# Patient Record
Sex: FEMALE | Race: WHITE | NOT HISPANIC OR LATINO | Employment: OTHER | URBAN - METROPOLITAN AREA
[De-identification: names, ages, dates, MRNs, and addresses within clinical notes are randomized per-mention and may not be internally consistent; named-entity substitution may affect disease eponyms.]

---

## 2019-05-08 ENCOUNTER — OFFICE VISIT (OUTPATIENT)
Dept: URGENT CARE | Facility: CLINIC | Age: 64
End: 2019-05-08
Payer: COMMERCIAL

## 2019-05-08 VITALS
HEART RATE: 56 BPM | OXYGEN SATURATION: 96 % | RESPIRATION RATE: 16 BRPM | HEIGHT: 72 IN | SYSTOLIC BLOOD PRESSURE: 110 MMHG | DIASTOLIC BLOOD PRESSURE: 82 MMHG | BODY MASS INDEX: 20.05 KG/M2 | TEMPERATURE: 98.8 F | WEIGHT: 148 LBS

## 2019-05-08 DIAGNOSIS — J01.00 ACUTE NON-RECURRENT MAXILLARY SINUSITIS: ICD-10-CM

## 2019-05-08 PROCEDURE — 99204 OFFICE O/P NEW MOD 45 MIN: CPT | Performed by: PHYSICIAN ASSISTANT

## 2019-05-08 RX ORDER — CITALOPRAM 40 MG/1
TABLET ORAL
Refills: 4 | COMMUNITY
Start: 2019-04-12

## 2019-05-08 RX ORDER — AMOXICILLIN AND CLAVULANATE POTASSIUM 875; 125 MG/1; MG/1
TABLET, FILM COATED ORAL
Qty: 14 TAB | Refills: 0 | Status: SHIPPED | OUTPATIENT
Start: 2019-05-08

## 2019-05-08 ASSESSMENT — ENCOUNTER SYMPTOMS
CHILLS: 0
COUGH: 1
SORE THROAT: 1
SINUS PAIN: 1
SWOLLEN GLANDS: 0
NECK PAIN: 0
HEADACHES: 1
SINUS PRESSURE: 1
HOARSE VOICE: 1
DIAPHORESIS: 0

## 2019-05-08 NOTE — PROGRESS NOTES
Subjective:      Suzi Panda is a 63 y.o. female who presents with Sinus Problem (x 7-8 days, Nasal congestion, stuffy and runny nose, headaches) and Cough ( x 7-8 days, productive cough)            Sinusitis   This is a new problem. The current episode started in the past 7 days. The problem is unchanged. There has been no fever. The fever has been present for less than 1 day. Her pain is at a severity of 5/10. The pain is moderate. Associated symptoms include congestion, coughing, headaches, a hoarse voice, sinus pressure and a sore throat. Pertinent negatives include no chills, diaphoresis, neck pain, sneezing or swollen glands. Past treatments include nothing. The treatment provided no relief.     Past medical history: Is not pertinent to this examination  Past surgical history: Not pertinent to this examination  Family history: Is not pertinent to this examination  Allergies:bactrim  Social history: Is reviewed in Epic  Meds:   Current Outpatient Prescriptions on File Prior to Visit   Medication Sig Dispense Refill   • phenazopyridine (PYRIDIUM) 200 MG TABS Take 1 Tab by mouth 3 times a day as needed for Mild Pain. 6 Each 0   • Phenazopyridine HCl (PYRIDIUM PO) Take  by mouth.     • ibuprofen (MOTRIN) 200 MG TABS Take 200 mg by mouth every 6 hours as needed.     • phenazopyridine (PYRIDIUM) 200 MG TABS Take 1 Tab by mouth 3 times a day as needed for Mild Pain. 6 Each 0     No current facility-administered medications on file prior to visit.          Review of Systems   Constitutional: Negative for chills and diaphoresis.   HENT: Positive for congestion, hoarse voice, sinus pain, sinus pressure and sore throat. Negative for sneezing.    Respiratory: Positive for cough.    Musculoskeletal: Negative for neck pain.   Neurological: Positive for headaches.   All other systems reviewed and are negative.         Objective:     /82 (BP Location: Left arm, Patient Position: Sitting, BP Cuff Size: Adult)   " Pulse (!) 56   Temp 37.1 °C (98.8 °F) (Temporal)   Resp 16   Ht 1.829 m (6' 0.01\")   Wt 67.1 kg (148 lb)   SpO2 96%   BMI 20.07 kg/m²      Physical Exam       Gen.: Patient is A and O ×3, no acute distress, well-nourished well-hydrated  Vitals: Are listed and unremarkable  HEENT: Heads normocephalic, atraumatic, PERRLA, extraocular movements intact, TMs and oropharynx clear  Neck: Soft supple without cervical lymphadenopathy  Cardiovascular: Regular rate and rhythm normal S1 and S2. No murmurs, rubs or gallops  Lungs are clear to auscultation bilaterally. no wheezes rales or rhonchi  Abdomen is soft, nontender, nondistended with good bowel sounds, no hepatosplenomegaly  Skin: Is well perfused without evidence of rash or lesions  Neurological:  cranial nerves II through XII were assessed and intact.  Musculoskeletal: Full range of motion, 5 out of 5 strength against resistance  Neurovascularly: Intact with a 2 second cap refill, good distal pulses       Assessment/Plan:     1. Acute non-recurrent maxillary sinusitis  Discussed likely viral etiology.  Patient requesting antibiotic.  Contingent prescription given.  Recommend antihistamine decongestant follow-up if symptoms persist or worsen despite treatment  - amoxicillin-clavulanate (AUGMENTIN) 875-125 MG Tab; Take one pill twice a day with food for a week  Dispense: 14 Tab; Refill: 0      "

## 2021-03-03 DIAGNOSIS — Z23 NEED FOR VACCINATION: ICD-10-CM

## 2024-02-13 ENCOUNTER — HOSPITAL ENCOUNTER (OUTPATIENT)
Dept: RADIOLOGY | Facility: MEDICAL CENTER | Age: 69
End: 2024-02-13
Attending: FAMILY MEDICINE
Payer: MEDICARE

## 2024-02-13 DIAGNOSIS — R07.89 CHEST PRESSURE: ICD-10-CM

## 2024-02-13 PROCEDURE — 93017 CV STRESS TEST TRACING ONLY: CPT

## 2024-02-13 PROCEDURE — 71046 X-RAY EXAM CHEST 2 VIEWS: CPT

## 2024-02-14 PROCEDURE — 93018 CV STRESS TEST I&R ONLY: CPT | Performed by: INTERNAL MEDICINE

## 2024-02-15 ENCOUNTER — HOSPITAL ENCOUNTER (OUTPATIENT)
Dept: RADIOLOGY | Facility: MEDICAL CENTER | Age: 69
End: 2024-02-15
Attending: FAMILY MEDICINE
Payer: MEDICARE

## 2024-02-15 DIAGNOSIS — R07.89 OTHER CHEST PAIN: ICD-10-CM

## 2024-02-15 DIAGNOSIS — R93.89 ABNORMAL CHEST X-RAY: ICD-10-CM

## 2024-02-15 PROCEDURE — 71250 CT THORAX DX C-: CPT

## 2024-05-15 ENCOUNTER — HOSPITAL ENCOUNTER (OUTPATIENT)
Dept: RADIOLOGY | Facility: MEDICAL CENTER | Age: 69
End: 2024-05-15
Attending: FAMILY MEDICINE
Payer: MEDICARE

## 2024-05-15 DIAGNOSIS — R91.1 LUNG NODULE: ICD-10-CM
